# Patient Record
Sex: FEMALE | Race: WHITE | NOT HISPANIC OR LATINO | Employment: FULL TIME | URBAN - METROPOLITAN AREA
[De-identification: names, ages, dates, MRNs, and addresses within clinical notes are randomized per-mention and may not be internally consistent; named-entity substitution may affect disease eponyms.]

---

## 2020-11-02 ENCOUNTER — HISTORICAL (OUTPATIENT)
Dept: ADMINISTRATIVE | Facility: HOSPITAL | Age: 45
End: 2020-11-02

## 2020-11-03 LAB — GRAM STN SPEC: NORMAL

## 2020-11-05 LAB — FINAL CULTURE: NORMAL

## 2020-12-18 ENCOUNTER — HISTORICAL (OUTPATIENT)
Dept: RADIOLOGY | Facility: HOSPITAL | Age: 45
End: 2020-12-18

## 2021-01-11 ENCOUNTER — HISTORICAL (OUTPATIENT)
Dept: ADMINISTRATIVE | Facility: HOSPITAL | Age: 46
End: 2021-01-11

## 2021-01-11 LAB
ABS NEUT (OLG): 2.45 X10(3)/MCL (ref 2.1–9.2)
ALBUMIN SERPL-MCNC: 4.2 GM/DL (ref 3.5–5)
ALBUMIN/GLOB SERPL: 1.3 RATIO (ref 1.1–2)
ALP SERPL-CCNC: 36 UNIT/L (ref 40–150)
ALT SERPL-CCNC: 15 UNIT/L (ref 0–55)
APPEARANCE, UA: CLEAR
AST SERPL-CCNC: 19 UNIT/L (ref 5–34)
BACTERIA #/AREA URNS AUTO: ABNORMAL /HPF
BASOPHILS # BLD AUTO: 0 X10(3)/MCL (ref 0–0.2)
BASOPHILS NFR BLD AUTO: 1 %
BILIRUB SERPL-MCNC: 0.6 MG/DL
BILIRUB UR QL STRIP: NEGATIVE
BILIRUBIN DIRECT+TOT PNL SERPL-MCNC: 0.3 MG/DL (ref 0–0.5)
BILIRUBIN DIRECT+TOT PNL SERPL-MCNC: 0.3 MG/DL (ref 0–0.8)
BUN SERPL-MCNC: 10 MG/DL (ref 7–18.7)
CALCIUM SERPL-MCNC: 8.6 MG/DL (ref 8.4–10.2)
CHLORIDE SERPL-SCNC: 105 MMOL/L (ref 98–107)
CO2 SERPL-SCNC: 26 MMOL/L (ref 22–29)
COLOR UR: ABNORMAL
CREAT SERPL-MCNC: 0.91 MG/DL (ref 0.55–1.02)
EOSINOPHIL # BLD AUTO: 0 X10(3)/MCL (ref 0–0.9)
EOSINOPHIL NFR BLD AUTO: 1 %
ERYTHROCYTE [DISTWIDTH] IN BLOOD BY AUTOMATED COUNT: 12.2 % (ref 11.5–14.5)
GLOBULIN SER-MCNC: 3.3 GM/DL (ref 2.4–3.5)
GLUCOSE (UA): NEGATIVE
GLUCOSE SERPL-MCNC: 77 MG/DL (ref 74–100)
HCT VFR BLD AUTO: 41.6 % (ref 35–46)
HGB BLD-MCNC: 13.8 GM/DL (ref 12–16)
HGB UR QL STRIP: NEGATIVE
HYALINE CASTS #/AREA URNS LPF: ABNORMAL /LPF
IMM GRANULOCYTES # BLD AUTO: 0.01 10*3/UL
IMM GRANULOCYTES NFR BLD AUTO: 0 %
KETONES UR QL STRIP: NEGATIVE
LEUKOCYTE ESTERASE UR QL STRIP: NEGATIVE
LYMPHOCYTES # BLD AUTO: 1.9 X10(3)/MCL (ref 0.6–4.6)
LYMPHOCYTES NFR BLD AUTO: 39 %
MCH RBC QN AUTO: 35.4 PG (ref 26–34)
MCHC RBC AUTO-ENTMCNC: 33.2 GM/DL (ref 31–37)
MCV RBC AUTO: 106.7 FL (ref 80–100)
MONOCYTES # BLD AUTO: 0.5 X10(3)/MCL (ref 0.1–1.3)
MONOCYTES NFR BLD AUTO: 9 %
NEUTROPHILS # BLD AUTO: 2.45 X10(3)/MCL (ref 2.1–9.2)
NEUTROPHILS NFR BLD AUTO: 50 %
NITRITE UR QL STRIP: NEGATIVE
PH UR STRIP: 7 [PH] (ref 4.5–8)
PLATELET # BLD AUTO: 195 X10(3)/MCL (ref 130–400)
PMV BLD AUTO: 10.4 FL (ref 7.4–10.4)
POTASSIUM SERPL-SCNC: 3.8 MMOL/L (ref 3.5–5.1)
PROT SERPL-MCNC: 7.5 GM/DL (ref 6.4–8.3)
PROT UR QL STRIP: NEGATIVE
RBC # BLD AUTO: 3.9 X10(6)/MCL (ref 4–5.2)
RBC #/AREA URNS AUTO: ABNORMAL /HPF
SODIUM SERPL-SCNC: 139 MMOL/L (ref 136–145)
SP GR UR STRIP: 1 (ref 1–1.03)
SQUAMOUS #/AREA URNS LPF: ABNORMAL /LPF
T4 FREE SERPL-MCNC: 1.05 NG/DL (ref 0.7–1.48)
TSH SERPL-ACNC: 1.59 UIU/ML (ref 0.35–4.94)
UROBILINOGEN UR STRIP-ACNC: NORMAL
WBC # SPEC AUTO: 4.9 X10(3)/MCL (ref 4.5–11)
WBC #/AREA URNS AUTO: ABNORMAL /HPF

## 2021-01-13 LAB — FINAL CULTURE: NO GROWTH

## 2021-02-05 ENCOUNTER — HISTORICAL (OUTPATIENT)
Dept: ADMINISTRATIVE | Facility: HOSPITAL | Age: 46
End: 2021-02-05

## 2021-02-05 LAB
CHOLEST SERPL-MCNC: 134 MG/DL
CHOLEST/HDLC SERPL: 2 {RATIO} (ref 0–5)
HDLC SERPL-MCNC: 54 MG/DL (ref 35–60)
LDLC SERPL CALC-MCNC: 67 MG/DL (ref 50–140)
TRIGL SERPL-MCNC: 63 MG/DL (ref 37–140)
VLDLC SERPL CALC-MCNC: 13 MG/DL

## 2021-02-23 ENCOUNTER — HISTORICAL (OUTPATIENT)
Dept: ADMINISTRATIVE | Facility: HOSPITAL | Age: 46
End: 2021-02-23

## 2021-02-23 LAB — SARS-COV-2 RNA RESP QL NAA+PROBE: NEGATIVE

## 2021-02-25 LAB — FINAL CULTURE: NORMAL

## 2021-04-08 LAB — SARS-COV-2 RNA RESP QL NAA+PROBE: NEGATIVE

## 2021-08-02 ENCOUNTER — HISTORICAL (OUTPATIENT)
Dept: ADMINISTRATIVE | Facility: HOSPITAL | Age: 46
End: 2021-08-02

## 2021-08-02 LAB — SARS-COV-2 RNA RESP QL NAA+PROBE: POSITIVE

## 2021-10-17 ENCOUNTER — HISTORICAL (OUTPATIENT)
Dept: LAB | Facility: HOSPITAL | Age: 46
End: 2021-10-17

## 2021-11-03 LAB
HUMAN PAPILLOMAVIRUS (HPV): NORMAL
PAP RECOMMENDATION EXT: NORMAL
PAP SMEAR: NORMAL

## 2022-01-03 ENCOUNTER — HISTORICAL (OUTPATIENT)
Dept: ADMINISTRATIVE | Facility: HOSPITAL | Age: 47
End: 2022-01-03

## 2022-01-03 LAB
ABS NEUT (OLG): 3.67 X10(3)/MCL (ref 2.1–9.2)
ALBUMIN SERPL-MCNC: 4 GM/DL (ref 3.5–5)
ALBUMIN/GLOB SERPL: 1.2 RATIO (ref 1.1–2)
ALP SERPL-CCNC: 41 UNIT/L (ref 40–150)
ALT SERPL-CCNC: 14 UNIT/L (ref 0–55)
APPEARANCE, UA: CLEAR
AST SERPL-CCNC: 18 UNIT/L (ref 5–34)
BACTERIA SPEC CULT: ABNORMAL
BASOPHILS # BLD AUTO: 0 X10(3)/MCL (ref 0–0.2)
BASOPHILS NFR BLD AUTO: 0 %
BILIRUB SERPL-MCNC: 0.4 MG/DL
BILIRUB UR QL STRIP: NEGATIVE
BILIRUBIN DIRECT+TOT PNL SERPL-MCNC: 0.2 MG/DL (ref 0–0.5)
BILIRUBIN DIRECT+TOT PNL SERPL-MCNC: 0.2 MG/DL (ref 0–0.8)
BUN SERPL-MCNC: 15.1 MG/DL (ref 7–18.7)
CALCIUM SERPL-MCNC: 9.3 MG/DL (ref 8.7–10.5)
CHLORIDE SERPL-SCNC: 106 MMOL/L (ref 98–107)
CO2 SERPL-SCNC: 26 MMOL/L (ref 22–29)
COLOR UR: COLORLESS
CREAT SERPL-MCNC: 0.78 MG/DL (ref 0.55–1.02)
DEPRECATED CALCIDIOL+CALCIFEROL SERPL-MC: 36.6 NG/ML (ref 30–80)
EOSINOPHIL # BLD AUTO: 0 X10(3)/MCL (ref 0–0.9)
EOSINOPHIL NFR BLD AUTO: 0 %
ERYTHROCYTE [DISTWIDTH] IN BLOOD BY AUTOMATED COUNT: 12.6 % (ref 11.5–14.5)
EST. AVERAGE GLUCOSE BLD GHB EST-MCNC: 91.1 MG/DL
GLOBULIN SER-MCNC: 3.4 GM/DL (ref 2.4–3.5)
GLUCOSE (UA): NORMAL /UL
GLUCOSE SERPL-MCNC: 80 MG/DL (ref 74–100)
HBA1C MFR BLD: 4.8 %
HCT VFR BLD AUTO: 42.1 % (ref 35–46)
HGB BLD-MCNC: 14.1 GM/DL (ref 12–16)
HGB UR QL STRIP: NEGATIVE /HPF
HYALINE CASTS #/AREA URNS LPF: ABNORMAL /LPF
IMM GRANULOCYTES # BLD AUTO: 0.02 10*3/UL
IMM GRANULOCYTES NFR BLD AUTO: 0 %
KETONES UR QL STRIP: NEGATIVE /UL
LEUKOCYTE ESTERASE UR QL STRIP: NEGATIVE
LYMPHOCYTES # BLD AUTO: 1.9 X10(3)/MCL (ref 0.6–4.6)
LYMPHOCYTES NFR BLD AUTO: 31 %
MCH RBC QN AUTO: 35.6 PG (ref 26–34)
MCHC RBC AUTO-ENTMCNC: 33.5 GM/DL (ref 31–37)
MCV RBC AUTO: 106.3 FL (ref 80–100)
MONOCYTES # BLD AUTO: 0.4 X10(3)/MCL (ref 0.1–1.3)
MONOCYTES NFR BLD AUTO: 6 %
MUCOUS THREADS URNS QL MICRO: ABNORMAL /LPF
NEUTROPHILS # BLD AUTO: 3.67 X10(3)/MCL (ref 2.1–9.2)
NEUTROPHILS NFR BLD AUTO: 61 %
NITRITE UR QL STRIP: NEGATIVE
NRBC BLD AUTO-RTO: 0 % (ref 0–0.2)
PH UR STRIP: 5 /UL (ref 4.5–8)
PLATELET # BLD AUTO: 199 X10(3)/MCL (ref 130–400)
PMV BLD AUTO: 10.2 FL (ref 7.4–10.4)
POTASSIUM SERPL-SCNC: 4.7 MMOL/L (ref 3.5–5.1)
PROT SERPL-MCNC: 7.4 GM/DL (ref 6.4–8.3)
PROT UR QL STRIP: NEGATIVE /UL
RBC # BLD AUTO: 3.96 X10(6)/MCL (ref 4–5.2)
RBC #/AREA URNS HPF: ABNORMAL /HPF
SODIUM SERPL-SCNC: 139 MMOL/L (ref 136–145)
SP GR UR STRIP: 1.01 (ref 1–1.03)
SQUAMOUS EPITHELIAL, UA: ABNORMAL /LPF
T4 FREE SERPL-MCNC: 0.94 NG/DL (ref 0.7–1.48)
TSH SERPL-ACNC: 0.56 UIU/ML (ref 0.35–4.94)
UROBILINOGEN UR STRIP-ACNC: NORMAL /HPF
VIT B12 SERPL-MCNC: 529 PG/ML (ref 213–816)
WBC # SPEC AUTO: 6 X10(3)/MCL (ref 4.5–11)
WBC #/AREA URNS HPF: ABNORMAL /HPF

## 2022-01-20 ENCOUNTER — HISTORICAL (OUTPATIENT)
Dept: ADMINISTRATIVE | Facility: HOSPITAL | Age: 47
End: 2022-01-20

## 2022-01-20 LAB
FLUAV AG UPPER RESP QL IA.RAPID: NEGATIVE
FLUBV AG UPPER RESP QL IA.RAPID: NEGATIVE
SARS-COV-2 RNA RESP QL NAA+PROBE: NEGATIVE

## 2022-04-05 ENCOUNTER — HISTORICAL (OUTPATIENT)
Dept: RADIOLOGY | Facility: HOSPITAL | Age: 47
End: 2022-04-05

## 2022-04-05 ENCOUNTER — HISTORICAL (OUTPATIENT)
Dept: ADMINISTRATIVE | Facility: HOSPITAL | Age: 47
End: 2022-04-05

## 2022-04-10 ENCOUNTER — HISTORICAL (OUTPATIENT)
Dept: ADMINISTRATIVE | Facility: HOSPITAL | Age: 47
End: 2022-04-10
Payer: COMMERCIAL

## 2022-04-24 VITALS
SYSTOLIC BLOOD PRESSURE: 124 MMHG | HEIGHT: 65 IN | OXYGEN SATURATION: 97 % | BODY MASS INDEX: 21.6 KG/M2 | DIASTOLIC BLOOD PRESSURE: 88 MMHG | WEIGHT: 129.63 LBS

## 2022-05-03 NOTE — HISTORICAL [facility] CERNER
This is a historical note converted from [place]. Formatting and pictures may have been removed.  Please reference [place] for original formatting and attached multimedia. Chief Complaint  Abrasion, corneal  History of Present Illness  Ms. Beltran is a 45 year old female with no significant PMH who presents as a referral from ED for corneal ulcer. The patient reports 3-4 days ago her left eye became irritated and very red. She stopped wearing her contact lenses at that time and noted the redness to improve slightly, however, this morning she put her contacts back in to drive to the ED. She denies previous history of ulcers. She has worn contacts for 3 years. She uses monthly contacts but uses them for two months. She never sleeps in her contacts and uses [employer] or [employer] brand contact solution. Her most recent contacts are approx 2 weeks old.  ?  POH: +CTL wear, no trauma, no surgeries  Fam Hx: no history of glaucoma or macular degeneration  PMHx: None  ?  Review of Systems  12 point ROS negative except as noted in HPI  Physical Exam  Vitals & Measurements  HT:?162.00?cm? WT:?62.000?kg? BMI:?23.62?  OD cc : ??20/25?  OS sc : ??20/200?  OS Pinhole : ??20/30?  Visual Acuity Comments : ??OD 20/25 WITH CONTACT IN PLACE, PH NI?  ?Tonometry Method : ??Electronic indentation?  Tonometry Time of Day : ??15:13 CST?  OD Intraocular Pressure : ??10 mmHg?  OS Intraocular Pressure : ??6 mmHg?  ?  Pupils: 4-->2, no APD, OU  EOM: Full, ortho, OU  CVF:Full OU  ?   Exam:  External: Normal OU  ?   SLE:  L/L: Normal OU  C/S: white and quiet OD // tr injection  K: scattered PEE OD // paracentral <1mm stromal infiltrate with overlying negative staining at 8 oclock  AC: Deep and quiet OD // 1 cell per HPF  Iris: FRR, no NVI OU  Lens: Clear OU  Vit: Clear OU  ?   Dilated with P&T at 330  ?   DFE:  N: pink, sharp, no NVD OU  C/D:0.2  Macula: Flat, good FLR OU, faint 1/4DD choroidal nevus inferior to disc along arcade OD  Vessels: Normal  OU  Periphery: Flat, no h/t/b 360 OU  ?   B scan OD: flat, no masses  Fundus photos today  ?   Assessment/Plan:  1. Corneal Ulcer, left eye  - +CTL  - Contacts sent for culture today  - Small, paracentral, no epi defect  - Start vigamox QID OS  ?   2. Choroidal nevus, OD  - Fundus photos today  - Within 2 DD of optic nerve  - Bscan flat  - Monitor  ?  Seen and dw Dr. Cormier  ?  RTC 1 day for K check  Referrals  Clinic Follow up, *Est. 11/03/20 3:00:00 CST, Order for future visit, Corneal ulcer of left eye  Choroidal nevus of right eye, [facility]   Problem List/Past Medical History  Ongoing  No qualifying data  Historical  Pregnant  Pregnant  Pregnant  Pregnant  Procedure/Surgical History  Dilation and curettage (1995)  Bilateral tubal ligation  Ovarian cystectomy  Uterine operation   Medications  Ambien, Oral, Once a day (at bedtime),? ?Not taking  Allergies  No Known Medication Allergies  Social History  Abuse/Neglect  No, 11/02/2020  No, No, Yes, 11/02/2020  Alcohol  Current, 1-2 times per month, 10/01/2020  Employment/School  Employed, 10/01/2020  Exercise  Exercise type: Weight lifting, cardio., 10/01/2020  Home/Environment  Lives with roommate., 10/01/2020  Nutrition/Health  Regular, 10/01/2020  Sexual  Sexually active: No. Gender Identity Identifies as female., 10/01/2020  Substance Use  Never, 10/01/2020  Tobacco  Never (less than 100 in lifetime), N/A, 11/02/2020  Never (less than 100 in lifetime), No, 11/02/2020  Family History  Primary malignant neoplasm of breast: Aunt.  Primary malignant neoplasm of ovary: Mother.  Health Maintenance  Health Maintenance  ???Pending?(in the next year)  ??? ??Due?  ??? ? ? ?Influenza Vaccine due??10/01/20??and every 1??day(s)  ??? ? ? ?Diabetes Screening due??11/02/20??Unknown Frequency  ??? ? ? ?Lipid Screening due??11/02/20??Unknown Frequency  ??? ? ? ?Tetanus Vaccine due??11/02/20??and every 10??year(s)  ??? ??Due In Future?  ??? ? ? ?Obesity Screening not due  until??01/01/21??and every 1??year(s)  ??? ? ? ?Alcohol Misuse Screening not due until??01/02/21??and every 1??year(s)  ??? ? ? ?Depression Screening not due until??10/01/21??and every 1??year(s)  ??? ? ? ?ADL Screening not due until??10/01/21??and every 1??year(s)  ???Satisfied?(in the past 1 year)  ??? ??Satisfied?  ??? ? ? ?ADL Screening on??10/01/20.??Satisfied by Tiffanie Guillaume  ??? ? ? ?Alcohol Misuse Screening on??10/01/20.??Satisfied by Tiffanie Guillaume  ??? ? ? ?Blood Pressure Screening on??11/02/20.??Satisfied by Janette Rice  ??? ? ? ?Body Mass Index Check on??11/02/20.??Satisfied by CHERRY Anaya Daria  ??? ? ? ?Cervical Cancer Screening on??10/01/20.??Satisfied by Alaina Deleon  ??? ? ? ?Depression Screening on??10/01/20.??Satisfied by Tiffanie Guillaume  ??? ? ? ?Obesity Screening on??11/02/20.??Satisfied by CHERRY Anaya Daria  ?

## 2022-05-03 NOTE — HISTORICAL [facility] CERNER
This is a historical note converted from [place]. Formatting and pictures may have been removed.  Please reference K[place]on for original formatting and attached multimedia. Chief Complaint  EST pcp, Anxiety  History of Present Illness  45 Years?White?Female?presents to [facility] to establish care?with PCP. ?PMH (per review of?available EMR?and confirmed with patient).  ?   Previous PCP:? denies  PmHx:? Insomnia, Anxiety  SHx: Tubal Ligation (),  Uterus surgery for tilting; Cyst removed from ovary  FHx:?? Mother:?ovarian cancer, still living-hysterectomy  ??????????Father: CAD,  at 56 from [place]  ????????? Brother: HTN, CAD  ?   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 coke every couple of days  Alcohol intake???on weekends, wine?  Tobacco Intake?? no smoking  Illicit drug use? no drugs  Sexual history:? denies; prefers males  Lives with??? alone  Daily Exercise??? 3-4 days a week; lifts weights, cardio  ?   Complaints today:? c/o mind racing and tremors, trouble sleeping.? This has been occurring for a couple of weeks.? States she had a situation recently that she thinks is contributing. She feels as though her mind is racing and she is unable to stop it.? She also has mild tremor.? Has had some mild palpitations as well but denies CP, SOB, n/v/d.  ?   Cancer Screening:  PAP: Sees GYN? here; UTD   MM  ?   Vaccines:  Tetanus/Tdap:? due  Flu: refused  Review of Systems  General: negative except as stated in hpi  Skin: negative except as stated in hpi  HEENT: negative except as stated in hpi  Respiratory: negative except as stated in hpi  CV: negative except as stated in hpi  GI: negative except as stated in hpi  : negative except as stated in hpi  MS: negative except as stated in hpi  Neuro: negative except as stated in hpi  Hematologic: negative except as stated in hpi  Endocrine: negative except as stated in  hpi  Psych: negative except as stated in hpi  Physical Exam  Vitals & Measurements  T:?36.6? ?C (Oral)? HR:?70(Peripheral)? RR:?20? BP:?136/88? SpO2:?99%?  HT:?162.00?cm? WT:?66.900?kg? BMI:?25.49? LMP:?01/10/2020 00:00 CST?  General: Alert and oriented, No acute distress.  Head: Normocephalic, Atraumatic.  Eye: Pupils are equal, round and reactive to light, Extraocular movements intact, Sclera non-icteric.  Ears/Nose/Throat:?Normal, Mucosa moist, Clear. Active PND.  Neck/Thyroid: Supple, Non-tender, No carotid bruit, No lymphadenopathy, Full range of motion.  Respiratory: Clear to auscultation bilaterally, Respirations non-labored, Breath sounds equal, Symmetrical chest wall expansion, No wheezes, rales or rhonchi.  Cardiovascular: Regular rate and rhythm, No murmurs, rubs or gallops.  Gastrointestinal: Soft, Non-tender, Non-distended, Normal bowel sounds.  Musculoskeletal: Normal range of motion.  Integumentary: Warm, Dry, Intact, No suspicious lesions or rashes.  Extremities: No clubbing, cyanosis or edema.  Neurologic: No focal deficits, Cranial Nerves II-XII are grossly intact, Motor strength normal upper and lower extremities, Sensory exam intact.  Psychiatric: Normal interaction, Coherent speech, Appropriate affect.  Assessment/Plan  1.?Anxiety?F41.9  Lexapro 5 mg po daily  Refer to Psych NP  Take meds as prescribed.  Practice deep breathing or abdominal breathing exercises when anxiety occurs.  Exercise daily. Get sunlight daily.  Avoid caffeine, alcohol?and stimulants.  Do not use illicit drugs.  Practice positive phrases and repeat throughout the day, yoga,?lavender scents or Chamomile tea will help anxiety.  Set healthy boundaries, avoid people and conversations that increase stress.  Reports any symptoms of suicidal or homicidal ideations immediately, go to nearest emergency room.  Ordered:  escitalopram, 5 mg = 1 tab(s), Oral, Daily, # 30 tab(s), 3 Refill(s), Pharmacy: [id number], 162, cm,  Height/Length Dosing, 01/11/21 8:00:00 CST, 66.9, kg, Weight Dosing, 01/11/21 8:00:00 CST  ondansetron, 8 mg = 1 tab(s), Oral, TID, # 15 tab(s), 1 Refill(s), Pharmacy: [place] [facility] [id number] [id number], 162, cm, Height/Length Dosing, 01/11/21 8:00:00 CST, 66.9, kg, Weight Dosing, 01/11/21 8:00:00 CST  1160F- Medication reconciliation completed during visit, Anxiety, [facility], 01/11/21 8:42:00 CST  CBC w/ Auto Diff, Routine collect, 01/11/21 8:42:00 CST, Blood, Order for future visit, Stop date 01/11/21 8:42:00 CST, Lab Collect, Anxiety, 01/11/21 8:42:00 CST  Clinic Follow up, *Est. 02/11/21 3:00:00 CST, Order for future visit, Anxiety  Immunization due, [facility]  Comprehensive Metabolic Panel, Routine collect, 01/11/21 8:42:00 CST, Blood, Order for future visit, Stop date 01/11/21 8:42:00 CST, Lab Collect, Anxiety, 01/11/21 8:42:00 CST  Free T4, Routine collect, 01/11/21 8:42:00 CST, Blood, Order for future visit, Stop date 01/11/21 8:42:00 CST, Lab Collect, Anxiety, 01/11/21 8:42:00 CST  Office/Outpatient Visit Level 4 Established [id number] PC, Anxiety  Immunization due, [facility], 01/11/21 8:57:00 CST  Thyroid Stimulating Hormone, Routine collect, 01/11/21 8:42:00 CST, Blood, Order for future visit, Stop date 01/11/21 8:42:00 CST, Lab Collect, Anxiety, 01/11/21 8:42:00 CST  Urinalysis with Microscopic if Indicated, Routine collect, Urine, Order for future visit, 01/11/21 8:43:00 CST, Stop date 01/11/21 8:43:00 CST, Nurse collect, Anxiety  Urine Culture [id number], Routine collect, 01/11/21 8:43:00 CST, Order for future visit, Urine, Nurse collect, Stop date 01/11/21 8:43:00 CST, Anxiety  Vaccines initial, 01/11/21 9:02:00 CST, [facility], Routine, 01/11/21 9:02:00 CST, Anxiety  Immunization due  ?  2.?Immunization due?Z23,?Immunization due?Z23  TDAP?today  Ordered:  Clinic Follow up, *Est. 02/11/21 3:00:00 CST, Order for future visit, Anxiety  Immunization due, [facility]  Office/Outpatient Visit Level 4 Established [address] PC, Anxiety  Immunization due, [facility], 01/11/21 8:57:00 CST  Vaccines initial, 01/11/21 9:02:00 CST, [facility], Routine, 01/11/21 9:02:00 CST, Anxiety  Immunization due  ?  3.?Encounter for wellness examination?Z00.00  Labs today: CBC, CMP, TSH, Free T4, UA. Urine culture  FLP in 1 month  Ordered:  Lipid Panel, Routine collect, *Est. 02/11/21 3:00:00 CST, Blood, Order for future visit, *Est. Stop date 02/11/21 3:00:00 CST, Lab Collect, Encounter for wellness examination, 01/12/21 5:00:00 CST  ?  Referrals  [facility] Referral to IM Psych Nurse, Specialty: Mental Health, Outpatient, Reason: Treat and Evaluate Anxiety believed to be situational, Refer To: Outpatient, Mental Health, [facility], [address] [place] [address]., Start: 01/11/21 9:11:00 CST, Exclusions: No  Clinic Follow up, *Est. 02/11/21 3:00:00 CST, Order for future visit, Anxiety  Immunization due, [facility]   Problem List/Past Medical History  Ongoing  No chronic problems  Historical  Pregnant  Pregnant  Pregnant  Pregnant  Procedure/Surgical History  Dilation and curettage (1995)  Bilateral tubal ligation  Ovarian cystectomy  Uterine operation   Medications  Ambien, Oral, Once a day (at bedtime)  Lexapro 5 mg oral tablet, 5 mg= 1 tab(s), Oral, Daily, 3 refills  naproxen 500 mg oral delayed release tablet, See Instructions, 6 refills  Zofran ODT 8 mg oral tablet, disintegrating, 8 mg= 1 tab(s), Oral, TID, 1 refills  Allergies  No Known Medication Allergies  Social History  Abuse/Neglect  No, No, Yes, 01/11/2021  Alcohol  Current, 1-2 times per month, 10/01/2020  Employment/School  Employed, 10/01/2020  Exercise  Exercise type: Weight lifting, cardio., 10/01/2020  Financial/Legal Situation  None, 01/11/2021  Home/Environment  Lives with roommate., 10/01/2020    Never in , 01/11/2021  Nutrition/Health  Regular,  10/01/2020  Sexual  Sexually active: No. Gender Identity Identifies as female., 11/03/2020  Spiritual/Cultural  Non denomination, 01/11/2021  Substance Use  Never, 10/01/2020  Tobacco  Never (less than 100 in lifetime), N/A, 01/11/2021  Family History  Primary malignant neoplasm of breast: Aunt.  Primary malignant neoplasm of ovary: Mother.  Immunizations  Vaccine Date Status   tetanus/diphtheria/pertussis, acel(Tdap) 01/11/2021 Given   tetanus/diphtheria/pertussis, acel(Tdap) 12/28/2009 Recorded   measles/mumps/rubella virus vaccine 08/20/1991 Recorded   poliovirus vaccine, live, trivalent 01/27/1977 Recorded   poliovirus vaccine, live, trivalent 08/14/1975 Recorded   poliovirus vaccine, live, trivalent 08/22/1974 Recorded   poliovirus vaccine, live, trivalent 05/22/1974 Recorded   poliovirus vaccine, live, trivalent 03/13/1974 Recorded   Health Maintenance  Health Maintenance  ???Pending?(in the next year)  ??? ??Due?  ??? ? ? ?Lipid Screening due??01/11/21??Unknown Frequency  ??? ??Due In Future?  ??? ? ? ?ADL Screening not due until??10/01/21??and every 1??year(s)  ??? ? ? ?Obesity Screening not due until??01/01/22??and every 1??year(s)  ??? ? ? ?Alcohol Misuse Screening not due until??01/02/22??and every 1??year(s)  ???Satisfied?(in the past 1 year)  ??? ??Satisfied?  ??? ? ? ?ADL Screening on??10/01/20.??Satisfied by Tiffanie Guillaume  ??? ? ? ?Alcohol Misuse Screening on??01/11/21.??Satisfied by Gale Benavidez LPN  ??? ? ? ?Blood Pressure Screening on??01/11/21.??Satisfied by Gale Benavidez LPN  ??? ? ? ?Body Mass Index Check on??01/11/21.??Satisfied by Gale Benavidez LPN  ??? ? ? ?Breast Cancer Screening on??12/18/20.??Satisfied by Munira Damon  ??? ? ? ?Cervical Cancer Screening on??10/01/20.??Satisfied by Alaina Deleon  ??? ? ? ?Depression Screening on??01/11/21.??Satisfied by Gale Benavidez LPN  ??? ? ? ?Influenza Vaccine on??01/11/21.??Satisfied by Gale Benavidez LPN  ??? ? ?  ?Obesity Screening on??01/11/21.??Satisfied by Gale Benavidez LPN  ??? ? ? ?Tetanus Vaccine on??01/11/21.??Satisfied by Gale Benavidez LPN  ??? ??Refused?  ??? ? ? ?Influenza Vaccine on??01/11/21.??Recorded by Gale Benavidez LPN??Reason: Patient Refuses  ?

## 2022-05-06 ENCOUNTER — CLINICAL SUPPORT (OUTPATIENT)
Dept: FAMILY MEDICINE | Facility: CLINIC | Age: 47
End: 2022-05-06
Payer: COMMERCIAL

## 2022-05-06 ENCOUNTER — TELEPHONE (OUTPATIENT)
Dept: FAMILY MEDICINE | Facility: CLINIC | Age: 47
End: 2022-05-06

## 2022-05-06 DIAGNOSIS — Z00.00 ENCOUNTER FOR WELLNESS EXAMINATION: Primary | ICD-10-CM

## 2022-05-06 DIAGNOSIS — Z00.00 ENCOUNTER FOR WELLNESS EXAMINATION: ICD-10-CM

## 2022-05-06 LAB
APPEARANCE UR: CLEAR
BACTERIA #/AREA URNS AUTO: ABNORMAL /HPF
BASOPHILS # BLD AUTO: 0.05 X10(3)/MCL (ref 0–0.2)
BASOPHILS NFR BLD AUTO: 0.9 %
BILIRUB UR QL STRIP.AUTO: NEGATIVE MG/DL
COLOR UR AUTO: ABNORMAL
EOSINOPHIL # BLD AUTO: 0.07 X10(3)/MCL (ref 0–0.9)
EOSINOPHIL NFR BLD AUTO: 1.3 %
ERYTHROCYTE [DISTWIDTH] IN BLOOD BY AUTOMATED COUNT: 12.4 % (ref 11.5–17)
GLUCOSE UR QL STRIP.AUTO: NORMAL MG/DL
HCT VFR BLD AUTO: 40.9 % (ref 37–47)
HGB BLD-MCNC: 13.4 GM/DL (ref 12–16)
HYALINE CASTS #/AREA URNS LPF: ABNORMAL /LPF
IMM GRANULOCYTES # BLD AUTO: 0.01 X10(3)/MCL (ref 0–0.02)
IMM GRANULOCYTES NFR BLD AUTO: 0.2 % (ref 0–0.43)
KETONES UR QL STRIP.AUTO: NEGATIVE MG/DL
LEUKOCYTE ESTERASE UR QL STRIP.AUTO: NEGATIVE UNIT/L
LYMPHOCYTES # BLD AUTO: 2.01 X10(3)/MCL (ref 0.6–4.6)
LYMPHOCYTES NFR BLD AUTO: 37.2 %
MCH RBC QN AUTO: 34.8 PG (ref 27–31)
MCHC RBC AUTO-ENTMCNC: 32.8 MG/DL (ref 33–36)
MCV RBC AUTO: 106.2 FL (ref 80–94)
MONOCYTES # BLD AUTO: 0.44 X10(3)/MCL (ref 0.1–1.3)
MONOCYTES NFR BLD AUTO: 8.1 %
MUCOUS THREADS URNS QL MICRO: ABNORMAL /LPF
NEUTROPHILS # BLD AUTO: 2.8 X10(3)/MCL (ref 2.1–9.2)
NEUTROPHILS NFR BLD AUTO: 52.3 %
NITRITE UR QL STRIP.AUTO: NEGATIVE
NRBC BLD AUTO-RTO: 0 %
PH UR STRIP.AUTO: 5 [PH]
PLATELET # BLD AUTO: 190 X10(3)/MCL (ref 130–400)
PMV BLD AUTO: 10.2 FL (ref 9.4–12.4)
PROT UR QL STRIP.AUTO: NEGATIVE MG/DL
RBC # BLD AUTO: 3.85 X10(6)/MCL (ref 4.2–5.4)
RBC UR QL AUTO: NEGATIVE UNIT/L
SP GR UR STRIP.AUTO: 1.01
UROBILINOGEN UR STRIP-ACNC: 0.2 MG/DL
WBC # SPEC AUTO: 5.4 X10(3)/MCL (ref 4.5–11.5)
WBC #/AREA URNS AUTO: ABNORMAL /HPF

## 2022-05-06 PROCEDURE — 36415 COLL VENOUS BLD VENIPUNCTURE: CPT

## 2022-05-06 PROCEDURE — 85025 COMPLETE CBC W/AUTO DIFF WBC: CPT

## 2022-05-06 PROCEDURE — 81001 URINALYSIS AUTO W/SCOPE: CPT

## 2022-05-06 NOTE — TELEPHONE ENCOUNTER
Labs reordered    ----- Message from Gale Benavidez LPN sent at 5/5/2022  3:03 PM CDT -----  Regarding: FW: LABS    ----- Message -----  From: Massiel Manuel  Sent: 5/5/2022   8:09 AM CDT  To: Gale Benavidez LPN  Subject: LABS                                             PT CALLED SHE ASKED FOR ANOTHER ORDER FOR HER TO TAKE HER LABS SHE HAS PROMISED TO GO DO IT TOMORROW

## 2022-05-09 DIAGNOSIS — R71.8 ELEVATED MCV: Primary | ICD-10-CM

## 2022-05-19 DIAGNOSIS — R71.8 ELEVATED MCV: ICD-10-CM

## 2022-05-19 LAB
ALBUMIN SERPL-MCNC: 3.7 GM/DL (ref 3.5–5)
ALBUMIN/GLOB SERPL: 1.2 RATIO (ref 1.1–2)
ALP SERPL-CCNC: 32 UNIT/L (ref 40–150)
ALT SERPL-CCNC: 16 UNIT/L (ref 0–55)
AST SERPL-CCNC: 20 UNIT/L (ref 5–34)
BILIRUBIN DIRECT+TOT PNL SERPL-MCNC: 0.2 MG/DL (ref 0–0.5)
BILIRUBIN DIRECT+TOT PNL SERPL-MCNC: 0.3 MG/DL (ref 0–0.8)
BILIRUBIN DIRECT+TOT PNL SERPL-MCNC: 0.5 MG/DL
BUN SERPL-MCNC: 13.4 MG/DL (ref 7–18.7)
CALCIUM SERPL-MCNC: 9 MG/DL (ref 8.4–10.2)
CHLORIDE SERPL-SCNC: 107 MMOL/L (ref 98–107)
CHOLEST SERPL-MCNC: 136 MG/DL
CHOLEST/HDLC SERPL: 2 {RATIO} (ref 0–5)
CO2 SERPL-SCNC: 24 MMOL/L (ref 22–29)
CREAT SERPL-MCNC: 0.79 MG/DL (ref 0.55–1.02)
FOLATE SERPL-MCNC: 15.4 NG/ML (ref 7–31.4)
GLOBULIN SER-MCNC: 3.1 GM/DL (ref 2.4–3.5)
GLUCOSE SERPL-MCNC: 76 MG/DL (ref 74–100)
HDLC SERPL-MCNC: 58 MG/DL (ref 35–60)
LDLC SERPL CALC-MCNC: 70 MG/DL (ref 50–140)
POTASSIUM SERPL-SCNC: 4.6 MMOL/L (ref 3.5–5.1)
PROT SERPL-MCNC: 6.8 GM/DL (ref 6.4–8.3)
SODIUM SERPL-SCNC: 137 MMOL/L (ref 136–145)
T4 FREE SERPL-MCNC: 0.75 NG/DL (ref 0.7–1.48)
TRIGL SERPL-MCNC: 39 MG/DL (ref 37–140)
TSH SERPL-ACNC: 1.02 UIU/ML (ref 0.35–4.94)
VLDLC SERPL CALC-MCNC: 8 MG/DL

## 2022-05-19 PROCEDURE — 82746 ASSAY OF FOLIC ACID SERUM: CPT | Performed by: NURSE PRACTITIONER

## 2022-05-19 PROCEDURE — 36415 COLL VENOUS BLD VENIPUNCTURE: CPT

## 2022-05-19 PROCEDURE — 84439 ASSAY OF FREE THYROXINE: CPT

## 2022-05-19 PROCEDURE — 80053 COMPREHEN METABOLIC PANEL: CPT

## 2022-05-19 PROCEDURE — 80061 LIPID PANEL: CPT

## 2022-05-19 PROCEDURE — 84443 ASSAY THYROID STIM HORMONE: CPT

## 2022-05-20 ENCOUNTER — TELEPHONE (OUTPATIENT)
Dept: FAMILY MEDICINE | Facility: CLINIC | Age: 47
End: 2022-05-20
Payer: COMMERCIAL

## 2022-05-20 NOTE — TELEPHONE ENCOUNTER
----- Message from RUSSELL Lindsay sent at 5/20/2022  7:26 AM CDT -----  Folate level was normal.  We will continue to monitor labs moving forward.

## 2022-05-23 ENCOUNTER — OFFICE VISIT (OUTPATIENT)
Dept: FAMILY MEDICINE | Facility: CLINIC | Age: 47
End: 2022-05-23
Payer: COMMERCIAL

## 2022-05-23 VITALS
RESPIRATION RATE: 20 BRPM | WEIGHT: 130.38 LBS | BODY MASS INDEX: 22.26 KG/M2 | HEIGHT: 64 IN | HEART RATE: 61 BPM | DIASTOLIC BLOOD PRESSURE: 84 MMHG | OXYGEN SATURATION: 99 % | TEMPERATURE: 98 F | SYSTOLIC BLOOD PRESSURE: 140 MMHG

## 2022-05-23 DIAGNOSIS — F41.9 ANXIETY: ICD-10-CM

## 2022-05-23 DIAGNOSIS — G47.00 INSOMNIA, UNSPECIFIED TYPE: Primary | ICD-10-CM

## 2022-05-23 PROCEDURE — 3008F BODY MASS INDEX DOCD: CPT | Mod: CPTII,,, | Performed by: NURSE PRACTITIONER

## 2022-05-23 PROCEDURE — 3077F SYST BP >= 140 MM HG: CPT | Mod: CPTII,,, | Performed by: NURSE PRACTITIONER

## 2022-05-23 PROCEDURE — 1159F MED LIST DOCD IN RCRD: CPT | Mod: CPTII,,, | Performed by: NURSE PRACTITIONER

## 2022-05-23 PROCEDURE — 3079F PR MOST RECENT DIASTOLIC BLOOD PRESSURE 80-89 MM HG: ICD-10-PCS | Mod: CPTII,,, | Performed by: NURSE PRACTITIONER

## 2022-05-23 PROCEDURE — 3077F PR MOST RECENT SYSTOLIC BLOOD PRESSURE >= 140 MM HG: ICD-10-PCS | Mod: CPTII,,, | Performed by: NURSE PRACTITIONER

## 2022-05-23 PROCEDURE — 99214 OFFICE O/P EST MOD 30 MIN: CPT | Mod: S$PBB,,, | Performed by: NURSE PRACTITIONER

## 2022-05-23 PROCEDURE — 1160F PR REVIEW ALL MEDS BY PRESCRIBER/CLIN PHARMACIST DOCUMENTED: ICD-10-PCS | Mod: CPTII,,, | Performed by: NURSE PRACTITIONER

## 2022-05-23 PROCEDURE — 1160F RVW MEDS BY RX/DR IN RCRD: CPT | Mod: CPTII,,, | Performed by: NURSE PRACTITIONER

## 2022-05-23 PROCEDURE — 3079F DIAST BP 80-89 MM HG: CPT | Mod: CPTII,,, | Performed by: NURSE PRACTITIONER

## 2022-05-23 PROCEDURE — 1159F PR MEDICATION LIST DOCUMENTED IN MEDICAL RECORD: ICD-10-PCS | Mod: CPTII,,, | Performed by: NURSE PRACTITIONER

## 2022-05-23 PROCEDURE — 99214 OFFICE O/P EST MOD 30 MIN: CPT | Mod: PBBFAC,PN | Performed by: NURSE PRACTITIONER

## 2022-05-23 PROCEDURE — 99214 PR OFFICE/OUTPT VISIT, EST, LEVL IV, 30-39 MIN: ICD-10-PCS | Mod: S$PBB,,, | Performed by: NURSE PRACTITIONER

## 2022-05-23 PROCEDURE — 3008F PR BODY MASS INDEX (BMI) DOCUMENTED: ICD-10-PCS | Mod: CPTII,,, | Performed by: NURSE PRACTITIONER

## 2022-05-23 RX ORDER — TEMAZEPAM 15 MG/1
15 CAPSULE ORAL NIGHTLY PRN
Qty: 30 CAPSULE | Refills: 3 | Status: SHIPPED | OUTPATIENT
Start: 2022-05-23 | End: 2022-05-23 | Stop reason: ALTCHOICE

## 2022-05-23 RX ORDER — FLUTICASONE PROPIONATE 50 MCG
SPRAY, SUSPENSION (ML) NASAL
COMMUNITY
Start: 2022-01-20

## 2022-05-23 RX ORDER — ESCITALOPRAM OXALATE 5 MG/1
TABLET ORAL
COMMUNITY
Start: 2022-04-04 | End: 2022-05-23 | Stop reason: DRUGHIGH

## 2022-05-23 RX ORDER — ESCITALOPRAM OXALATE 10 MG/1
10 TABLET ORAL DAILY
Qty: 30 TABLET | Refills: 11 | Status: SHIPPED | OUTPATIENT
Start: 2022-05-23 | End: 2023-05-23

## 2022-05-23 RX ORDER — DICLOFENAC SODIUM 75 MG/1
75 TABLET, DELAYED RELEASE ORAL
COMMUNITY
Start: 2022-02-09

## 2022-05-23 NOTE — PROGRESS NOTES
"Patient Name: Dina Beltran   : 1975  MRN: [id number]     SUBJECTIVE:  Dina Beltran is a 46 y.o. female here for Insomnia (Feels like medication not working)    HPI :  22:  Insomnia, Restoril not working anymore.  Insomnia:  Feels like medication not working. Does not take nightly because she is not able to sleep at night.   Anxiety: Feels like lexapro not working, unable to fall asleep.   Pt states she has been unable to take Restoril due to oversedation the next day, is very tired and unable to go to the gym.  Asking for other options including Xanax or Klonopin.  Discussed the side effects with her of each.  She used to take Melatonin years ago which helped but then had to get on Ambien.  She has tried and failed Ambien, Lunesta and now Restoril.  She has never been on Trazodone or Hydroyxzine but does state she has taken Benadryl in the past and slept fine on that.  She feels anxious and like her body is "out of whack".      22: 1 month FU Insomnia  This is a telehealth visit note. Patient was treated using telehealth, real time audio, video, or both according to [facility] protocols. Maria Luisa MORRELL FNP-C, conducted the visit from location identified below. The patient participated in the visit at a non-[facility] location selected by the patient (or patient's representative), identified below. I am licensed in the [facility]. The patient (or patient's representative) stated that they understood and accepted the privacy and security risks to their information at their location and has consented to telephone visit.  Patient was located at patient's home.  Maria Luisa MORRELL FNP-C, was located at [facility] [address].  We stopped Lunesta last visit and started Temazepam which is working. 7.5mg dose she is sleeping.    3/4/22: Insomnia FU-1 month  This is a telehealth visit note. Patient was treated using telehealth, real time audio, video, or both according to [facility] " protocols. Maria Luisa MORRELL FNP-C, conducted the visit from location identified below. The patient participated in the visit at a non-OU location selected by the patient (or patient's representative), identified below. I am licensed in the [facility]. The patient (or patient's representative) stated that they understood and accepted the privacy and security risks to their information at their location and has consented to telephone visit.  Patient was located at patient's home.  Maria Luisa MORRELL FNP-C, was located at [facility] [address].  I prescribed Lunesta last visit because Ambien was not working. Lunesta is working but she only takes 1/2 pill because it causes drowsiness next day.   MMG not done, reordered today  Due for FLP. last one in 2/21: Chol 134, HDL 54, Trig 63, LDL 67  Is taking pill at 6 or 630 and it takes an hour to fall asleep. Sleeping all the way through night. not able to work out because too tired    2/1/22: This is a telehealth visit note. Patient was treated using telehealth, real time audio, video, or both according to [facility] protocols. Maria Luisa MORRELL FNP-C, conducted the visit from location identified below. The patient participated in the visit at a non-OU location selected by the patient (or patient's representative), identified below. I am licensed in the [facility]. The patient (or patient's representative) stated that they understood and accepted the privacy and security risks to their information at their location and has consented to telephone visit.  Patient was located at patient's home.  Maria Luisa MORRELL FNP-C, was located at [address].  Insomnia. Ambien not working. Scheduled appt to discuss alternatives. She stopped Ambien on her own. Has been on Ambien for a long time and feels as though it just isnt working. Sleep onset late and only sleeping a couple of hours a night.    1/3/22: FU  "visit. Pt not seen since April of last year. Working at [employer] and has been really busy working a lot.  FLP in 2/5/21: Chol 134, HDL 54, Trig 63, LDL 67. Last labs in January 2021.  COVID in August.  Anxiety: Lexapro working, does not feel very anxious.   Fibroids: states bleeding and pain lessened. Feels that it is better. Diclofenac helps with pain in abdomen. Takes as needed.   Insomnia: Ambien needs to be refilled. Out x 1 month and has not been able to stay asleep once falling asleep. A little of both she states.     4/28/2021: Here for follow-up.   Anxiety: Lexapro working.  Fibroids: appt with GYN soon. Having multiple periods in a month. Prescribed Naproxen but it is not helping pain. Appt next Wednesday.  Insomnia: 7 years on Ambien    2/8/2021: Here for 1 month follow-up of anxiety. Rx Lexapro 5mg at last visit. NOREEN 12 at last visit. Today NOREEN=1. No nausea.    1/11/2021: Complaints today: c/o mind racing and tremors, trouble sleeping. This has been occurring for a couple of weeks. States she had a situation recently that she thinks is contributing. She feels as though her mind is racing and she is unable to stop it. She also has mild tremor. Has had some mild palpitations as well but denies CP, SOB, n/v/d.        The patient's allergies, medications, history, and problem list were updated as appropriate.    REVIEW OF SYSTEMS:  A comprehensive review of symptoms was completed and negative except as noted in the HPI.    OBJECTIVE:  Vital signs  Vitals:    05/23/22 0749   BP: (!) 140/84   BP Location: Right arm   Patient Position: Sitting   BP Method: Medium (Manual)   Pulse: 61   Resp: 20   Temp: 97.5 °F (36.4 °C)   SpO2: 99%   Weight: 59.1 kg (130 lb 6.4 oz)   Height: 5' 4" (1.626 m)        Physical Exam  Vitals and nursing note reviewed.   HENT:      Head: Normocephalic and atraumatic.   Cardiovascular:      Rate and Rhythm: Normal rate and regular rhythm.      Pulses: Normal pulses.      Heart sounds: " Normal heart sounds.   Pulmonary:      Breath sounds: Normal breath sounds.   Musculoskeletal:         General: Normal range of motion.      Cervical back: Normal range of motion.   Skin:     General: Skin is warm and dry.   Neurological:      General: No focal deficit present.      Mental Status: She is alert and oriented to person, place, and time.   Psychiatric:         Mood and Affect: Mood normal.          ASSESSMENT/PLAN:  1. Insomnia, unspecified type  Assessment & Plan:  Stop Restoril. Will try increase Lexapro and Melatonin and see if this works.  Will return in 3 weeks to reassess.   Avoid caffeine, alcohol and stimulants. Do not use illicit drugs.  Practice positive phrases and repeat throughout the day.  Try yoga, lavender scents or Chamomile tea to promote relaxation.  Set healthy boundaries, avoid people and conversations that increase stress.  Avoid caffeinated beverages after lunch  Avoid alcohol near bedtime (eg, late afternoon and evening)  Avoid smoking or other nicotine intake, particularly during the evening  Exercise regularly for at least 20 minutes, preferably more than four to five hours prior to bedtime  Avoid daytime naps, especially if they are longer than 20 to 30 minutes or occur late in the day  Resolve concerns or worries before bedtime  Try not to force sleep    Sleep Hygiene Techniques: Sleep hygiene refers to actions that tend to improve and maintain good sleep  Power down electronic devices at least one hour prior to bedtime.  Keep room dark; use eye mask or relaxation sound machine to promote rest.  Sleep as long as necessary to feel rested (usually seven to eight hours for adults) and then get out of bed  Maintain a regular sleep schedule, particularly a regular wake-up time in the morning        2. Anxiety  Assessment & Plan:  Increase Lexapro to 10mg po daily. Add Melatonin 10mg nightly to regimen.    Practice deep breathing or abdominal breathing exercises when anxiety  occurs.  Exercise daily. Get sunlight daily.  Avoid caffeine, alcohol and stimulants.  Practice positive phrases and repeat throughout the day, yoga, lavender scents or Chamomile tea will help anxiety.  Set healthy boundaries, avoid people and conversations that increase stress.  Reports any symptoms of suicidal or homicidal ideations immediately, if clinic is closed go to nearest emergency room.        Orders:  -     EScitalopram oxalate (LEXAPRO) 10 MG tablet     No results found for this or any previous visit (from the past 24 hour(s)).      Follow Up:  Follow up in about 3 weeks (around 6/13/2022) for Med Assessment, Virtual Visit.     This note was created with the assistance of a voice recognition software or phone dictation. There may be transcription errors as a result of using this technology however minimal. Effort has been made to assure accuracy of transcription but any obvious errors or omissions should be clarified with the author of the document

## 2022-05-23 NOTE — ASSESSMENT & PLAN NOTE
Increase Lexapro to 10mg po daily. Add Melatonin 10mg nightly to regimen.    Practice deep breathing or abdominal breathing exercises when anxiety occurs.  Exercise daily. Get sunlight daily.  Avoid caffeine, alcohol and stimulants.  Practice positive phrases and repeat throughout the day, yoga, lavender scents or Chamomile tea will help anxiety.  Set healthy boundaries, avoid people and conversations that increase stress.  Reports any symptoms of suicidal or homicidal ideations immediately, if clinic is closed go to nearest emergency room.

## 2022-05-23 NOTE — ASSESSMENT & PLAN NOTE
Stop Restoril. Will try increase Lexapro and Melatonin and see if this works.  Will return in 3 weeks to reassess.   Avoid caffeine, alcohol and stimulants. Do not use illicit drugs.  Practice positive phrases and repeat throughout the day.  Try yoga, lavender scents or Chamomile tea to promote relaxation.  Set healthy boundaries, avoid people and conversations that increase stress.  Avoid caffeinated beverages after lunch  Avoid alcohol near bedtime (eg, late afternoon and evening)  Avoid smoking or other nicotine intake, particularly during the evening  Exercise regularly for at least 20 minutes, preferably more than four to five hours prior to bedtime  Avoid daytime naps, especially if they are longer than 20 to 30 minutes or occur late in the day  Resolve concerns or worries before bedtime  Try not to force sleep    Sleep Hygiene Techniques: Sleep hygiene refers to actions that tend to improve and maintain good sleep  Power down electronic devices at least one hour prior to bedtime.  Keep room dark; use eye mask or relaxation sound machine to promote rest.  Sleep as long as necessary to feel rested (usually seven to eight hours for adults) and then get out of bed  Maintain a regular sleep schedule, particularly a regular wake-up time in the morning

## 2022-05-30 ENCOUNTER — PATIENT OUTREACH (OUTPATIENT)
Dept: ADMINISTRATIVE | Facility: HOSPITAL | Age: 47
End: 2022-05-30
Payer: COMMERCIAL

## 2022-06-20 ENCOUNTER — OFFICE VISIT (OUTPATIENT)
Dept: OPHTHALMOLOGY | Facility: CLINIC | Age: 47
End: 2022-06-20
Payer: COMMERCIAL

## 2022-06-20 VITALS — HEIGHT: 64 IN | BODY MASS INDEX: 22.2 KG/M2 | WEIGHT: 130.06 LBS

## 2022-06-20 DIAGNOSIS — D31.31 CHOROIDAL NEVUS OF RIGHT EYE: ICD-10-CM

## 2022-06-20 DIAGNOSIS — Z86.69 HISTORY OF CORNEAL ULCER: Primary | ICD-10-CM

## 2022-06-20 PROCEDURE — 99212 OFFICE O/P EST SF 10 MIN: CPT | Mod: PBBFAC,PO | Performed by: STUDENT IN AN ORGANIZED HEALTH CARE EDUCATION/TRAINING PROGRAM

## 2022-06-20 PROCEDURE — 92250 FUNDUS PHOTOGRAPHY W/I&R: CPT | Mod: PBBFAC,PO | Performed by: STUDENT IN AN ORGANIZED HEALTH CARE EDUCATION/TRAINING PROGRAM

## 2022-06-20 NOTE — PROGRESS NOTES
Assessment/Plan:  1. History of Corneal Ulcer, left eye  - Patient last seen 11/2020, resolved then  - CTL culture + for serratia liquefaciens  - Residual subepithelial scar  - Last documented 11/2020 GPC resolved today    2. Choroidal nevus, OD  - Fundus photos repeated today  - Within 2 DD of optic nerve, appears unchanged  - Monitor    RTC 1 year annual DFE

## 2022-06-21 PROBLEM — D31.31 CHOROIDAL NEVUS OF RIGHT EYE: Status: ACTIVE | Noted: 2022-06-21

## 2022-09-21 ENCOUNTER — HISTORICAL (OUTPATIENT)
Dept: ADMINISTRATIVE | Facility: HOSPITAL | Age: 47
End: 2022-09-21
Payer: COMMERCIAL

## 2023-07-24 ENCOUNTER — HOSPITAL ENCOUNTER (EMERGENCY)
Facility: HOSPITAL | Age: 48
Discharge: HOME | End: 2023-07-24
Attending: EMERGENCY MEDICINE
Payer: COMMERCIAL

## 2023-07-24 VITALS
BODY MASS INDEX: 21.27 KG/M2 | WEIGHT: 124.56 LBS | HEIGHT: 64 IN | RESPIRATION RATE: 18 BRPM | HEART RATE: 75 BPM | SYSTOLIC BLOOD PRESSURE: 136 MMHG | TEMPERATURE: 98 F | OXYGEN SATURATION: 100 % | DIASTOLIC BLOOD PRESSURE: 89 MMHG

## 2023-07-24 DIAGNOSIS — H53.8 BLURRY VISION: ICD-10-CM

## 2023-07-24 DIAGNOSIS — R51.9 NONINTRACTABLE EPISODIC HEADACHE, UNSPECIFIED HEADACHE TYPE: Primary | ICD-10-CM

## 2023-07-24 LAB
ALBUMIN SERPL-MCNC: 3.6 G/DL (ref 3.5–5)
ALBUMIN/GLOB SERPL: 1.1 RATIO (ref 1.1–2)
ALP SERPL-CCNC: 46 UNIT/L (ref 40–150)
ALT SERPL-CCNC: 19 UNIT/L (ref 0–55)
AMORPH URATE CRY URNS QL MICRO: ABNORMAL /UL
APPEARANCE UR: ABNORMAL
AST SERPL-CCNC: 21 UNIT/L (ref 5–34)
BACTERIA #/AREA URNS AUTO: ABNORMAL /HPF
BASOPHILS # BLD AUTO: 0.04 X10(3)/MCL
BASOPHILS NFR BLD AUTO: 0.6 %
BILIRUB UR QL STRIP.AUTO: NEGATIVE
BILIRUBIN DIRECT+TOT PNL SERPL-MCNC: 0.3 MG/DL
BUN SERPL-MCNC: 18.5 MG/DL (ref 9.8–20.1)
CALCIUM SERPL-MCNC: 8.7 MG/DL (ref 8.4–10.2)
CHLORIDE SERPL-SCNC: 106 MMOL/L (ref 98–107)
CO2 SERPL-SCNC: 26 MMOL/L (ref 22–29)
COLOR UR: YELLOW
CREAT SERPL-MCNC: 0.87 MG/DL (ref 0.55–1.02)
EOSINOPHIL # BLD AUTO: 0.06 X10(3)/MCL (ref 0–0.9)
EOSINOPHIL NFR BLD AUTO: 0.9 %
ERYTHROCYTE [DISTWIDTH] IN BLOOD BY AUTOMATED COUNT: 13 % (ref 11.5–17)
GFR SERPLBLD CREATININE-BSD FMLA CKD-EPI: >60 MLS/MIN/1.73/M2
GLOBULIN SER-MCNC: 3.3 GM/DL (ref 2.4–3.5)
GLUCOSE SERPL-MCNC: 86 MG/DL (ref 74–100)
GLUCOSE UR QL STRIP.AUTO: NORMAL
HCT VFR BLD AUTO: 36.1 % (ref 37–47)
HGB BLD-MCNC: 12.1 G/DL (ref 12–16)
HYALINE CASTS #/AREA URNS LPF: ABNORMAL /LPF
IMM GRANULOCYTES # BLD AUTO: 0.01 X10(3)/MCL (ref 0–0.04)
IMM GRANULOCYTES NFR BLD AUTO: 0.1 %
KETONES UR QL STRIP.AUTO: NEGATIVE
LEUKOCYTE ESTERASE UR QL STRIP.AUTO: NEGATIVE
LYMPHOCYTES # BLD AUTO: 2.18 X10(3)/MCL (ref 0.6–4.6)
LYMPHOCYTES NFR BLD AUTO: 32.6 %
MAGNESIUM SERPL-MCNC: 1.9 MG/DL (ref 1.6–2.6)
MCH RBC QN AUTO: 34.1 PG (ref 27–31)
MCHC RBC AUTO-ENTMCNC: 33.5 G/DL (ref 33–36)
MCV RBC AUTO: 101.7 FL (ref 80–94)
MONOCYTES # BLD AUTO: 0.57 X10(3)/MCL (ref 0.1–1.3)
MONOCYTES NFR BLD AUTO: 8.5 %
MUCOUS THREADS URNS QL MICRO: ABNORMAL /LPF
NEUTROPHILS # BLD AUTO: 3.82 X10(3)/MCL (ref 2.1–9.2)
NEUTROPHILS NFR BLD AUTO: 57.3 %
NITRITE UR QL STRIP.AUTO: NEGATIVE
NRBC BLD AUTO-RTO: 0 %
PH UR STRIP.AUTO: 7 [PH]
PLATELET # BLD AUTO: 205 X10(3)/MCL (ref 130–400)
PMV BLD AUTO: 10.4 FL (ref 7.4–10.4)
POTASSIUM SERPL-SCNC: 3.6 MMOL/L (ref 3.5–5.1)
PROT SERPL-MCNC: 6.9 GM/DL (ref 6.4–8.3)
PROT UR QL STRIP.AUTO: ABNORMAL
RBC # BLD AUTO: 3.55 X10(6)/MCL (ref 4.2–5.4)
RBC #/AREA URNS AUTO: ABNORMAL /HPF
RBC UR QL AUTO: NEGATIVE
SODIUM SERPL-SCNC: 139 MMOL/L (ref 136–145)
SP GR UR STRIP.AUTO: 1.03
SQUAMOUS #/AREA URNS LPF: ABNORMAL /HPF
TROPONIN I SERPL-MCNC: <0.01 NG/ML (ref 0–0.04)
UROBILINOGEN UR STRIP-ACNC: ABNORMAL
WBC # SPEC AUTO: 6.68 X10(3)/MCL (ref 4.5–11.5)
WBC #/AREA URNS AUTO: ABNORMAL /HPF

## 2023-07-24 PROCEDURE — 85025 COMPLETE CBC W/AUTO DIFF WBC: CPT | Performed by: PHYSICIAN ASSISTANT

## 2023-07-24 PROCEDURE — 99285 EMERGENCY DEPT VISIT HI MDM: CPT | Mod: 25

## 2023-07-24 PROCEDURE — 80053 COMPREHEN METABOLIC PANEL: CPT | Performed by: PHYSICIAN ASSISTANT

## 2023-07-24 PROCEDURE — 93005 ELECTROCARDIOGRAM TRACING: CPT

## 2023-07-24 PROCEDURE — 83735 ASSAY OF MAGNESIUM: CPT | Performed by: PHYSICIAN ASSISTANT

## 2023-07-24 PROCEDURE — 81001 URINALYSIS AUTO W/SCOPE: CPT | Performed by: PHYSICIAN ASSISTANT

## 2023-07-24 PROCEDURE — 84484 ASSAY OF TROPONIN QUANT: CPT | Performed by: PHYSICIAN ASSISTANT

## 2023-07-24 NOTE — ED PROVIDER NOTES
Encounter Date: 7/24/2023       History     Chief Complaint   Patient presents with    Headache     HA and blurred vision this morning; subjective left facial numbness. No arm drift, no facial droop, no slurred speech.     Headache, hypertension; chronic headaches for which no evaluation undertaken; this week she observes nausea      Headache   This is a recurrent problem. The current episode started more than 1 year ago. The problem occurs intermittently. The problem has been waxing and waning. The pain is located in the Retro-orbital region. The pain does not radiate. The pain quality is similar to prior headaches. The quality of the pain is described as throbbing. The pain is at a severity of 4/10. Associated symptoms include nausea. Pertinent negatives include no abdominal pain, abnormal behavior, anorexia, back pain, blurred vision, coughing, dizziness, drainage, ear pain, eye pain, eye redness or eye watering. The symptoms are aggravated by bright light and noise. She has tried acetaminophen for the symptoms. The treatment provided mild relief. Her past medical history is significant for hypertension and migraines in the family. There is no history of cancer or cluster headaches.   Review of patient's allergies indicates:  No Known Allergies  Past Medical History:   Diagnosis Date    Anxiety     Cornea scar     Depression      Past Surgical History:   Procedure Laterality Date    TUBAL LIGATION       History reviewed. No pertinent family history.  Social History     Tobacco Use    Smoking status: Some Days     Types: Vaping with nicotine    Smokeless tobacco: Never   Substance Use Topics    Alcohol use: Yes     Alcohol/week: 2.0 standard drinks     Types: 2 Glasses of wine per week    Drug use: Never     Review of Systems   HENT:  Negative for ear pain.    Eyes:  Negative for blurred vision, pain and redness.   Respiratory:  Negative for cough.    Gastrointestinal:  Positive for nausea. Negative for abdominal  pain and anorexia.   Musculoskeletal:  Negative for back pain.   Neurological:  Positive for headaches. Negative for dizziness.   All other systems reviewed and are negative.    Physical Exam     Initial Vitals [07/24/23 1412]   BP Pulse Resp Temp SpO2   (!) 186/97 85 16 98.4 °F (36.9 °C) 99 %      MAP       --         Physical Exam    Nursing note and vitals reviewed.  Constitutional: She appears well-developed and well-nourished.   HENT:   Head: Normocephalic and atraumatic.   Eyes: Conjunctivae and EOM are normal. Pupils are equal, round, and reactive to light.   Neck: No tracheal deviation present.   Normal range of motion.  Cardiovascular:  Normal rate, regular rhythm and normal heart sounds.           Pulmonary/Chest: Breath sounds normal. No respiratory distress. She exhibits no tenderness.   Abdominal: Abdomen is soft. She exhibits no distension. There is no abdominal tenderness. There is no rebound.   Musculoskeletal:         General: No tenderness. Normal range of motion.      Cervical back: Normal range of motion.     Neurological: She is alert and oriented to person, place, and time. GCS score is 15. GCS eye subscore is 4. GCS verbal subscore is 5. GCS motor subscore is 6.   Skin: No rash and no abscess noted.   Psychiatric: She has a normal mood and affect. Her behavior is normal. Judgment and thought content normal.       ED Course   Procedures  Labs Reviewed   URINALYSIS, REFLEX TO URINE CULTURE - Abnormal; Notable for the following components:       Result Value    Appearance, UA Hazy (*)     Protein, UA 1+ (*)     Urobilinogen, UA 1+ (*)     Bacteria, UA Few (*)     Squamous Epithelial Cells, UA Occ (*)     Mucous, UA Trace (*)     Hyaline Casts, UA 0-2 (*)     All other components within normal limits   CBC WITH DIFFERENTIAL - Abnormal; Notable for the following components:    RBC 3.55 (*)     Hct 36.1 (*)     .7 (*)     MCH 34.1 (*)     All other components within normal limits   TROPONIN I  - Normal   MAGNESIUM - Normal   CBC W/ AUTO DIFFERENTIAL    Narrative:     The following orders were created for panel order CBC Auto Differential.  Procedure                               Abnormality         Status                     ---------                               -----------         ------                     CBC with Differential[[id number]]        Abnormal            Final result                 Please view results for these tests on the individual orders.   COMPREHENSIVE METABOLIC PANEL   EXTRA TUBES    Narrative:     The following orders were created for panel order EXTRA TUBES.  Procedure                               Abnormality         Status                     ---------                               -----------         ------                     Light Blue Top Hold[[id number]]                              In process                 Red Top Hold[[id number]]                                     In process                   Please view results for these tests on the individual orders.   LIGHT BLUE TOP HOLD   RED TOP HOLD        ECG Results              EKG 12-lead (In process)  Result time 07/24/23 14:35:43      In process by Interface, Lab In [facility] (07/24/23 14:35:43)                   Narrative:    Test Reason : H53.8,    Vent. Rate : 071 BPM     Atrial Rate : 071 BPM     P-R Int : 122 ms          QRS Dur : 074 ms      QT Int : 422 ms       P-R-T Axes : 066 064 065 degrees     QTc Int : 458 ms    Normal sinus rhythm  Normal ECG  No previous ECGs available    Referred By: [email or web address]   SELF           Confirmed By:                                   Imaging Results              CT Head Without Contrast (Final result)  Result time 07/24/23 15:33:07      Final result by Rubén Conway MD (07/24/23 15:33:07)                   Impression:      No acute intracranial findings.      Electronically signed by: Rubén Conway  Date:    07/24/2023  Time:    15:33               Narrative:    EXAMINATION:  CT HEAD  WITHOUT CONTRAST    CLINICAL HISTORY:  Headache, chronic, new features or increased frequency;    TECHNIQUE:  CT imaging of the head performed from the skull base to the vertex without intravenous contrast.  mGycm. Automatic exposure control, adjustment of mA/kV or iterative reconstruction technique was used to reduce radiation.    COMPARISON:  None Available.    FINDINGS:  There is no acute cortical infarct, hemorrhage or mass lesion.  The ventricles are normal in size.    Visualized paranasal sinuses and mastoid air cells are clear.                                    X-Rays:   Independently Interpreted Readings:   Head CT: Negative ct head ;   Medications - No data to display  Medical Decision Making:   Initial Assessment:   Patient presents today with headache, nausea.  Patient does endorse several similar episodes in the past but she is concerned that they may be increasing in frequency and severity.  Her blood pressure is considerably elevated today.  It does seem possible the elevated blood pressure is related to anxiety and pain but risk is found sufficient to warrant an expanded evaluation to diminish the probability emergent causes of headache remain occult.  Differential Diagnosis:   Migraine, cluster, tension would be more likely causes of the symptom complex but risk of occult CNS neoplasm versus intracranial bleeding diathesis sufficient to warrant expanded evaluation.  Independently Interpreted Test(s):   I have ordered and independently interpreted X-rays - see prior notes.  I have ordered and independently interpreted EKG Reading(s) - see prior notes  Clinical Tests:   Lab Tests: Ordered and Reviewed  Radiological Study: Ordered and Reviewed  Medical Tests: Ordered and Reviewed  ED Management:  Patient is improved over course in the emergency department with conservative interventions.  Objective data are resulted in found reassuring.  Patient is discharged home with anticipatory guidance,  return precautions, follow-up instructions.  Home in stable condition without event.           ED Course as of 07/24/23 1704   Mon Jul 24, 2023   1551 Contaminated ua, unconvincing as uti ; [CT]   1551 Reassuring hemogram ; [CT]   1629 Normal magnesium ; [CT]   1629 Reassuring chemistries ; [CT]   1629 Negative troponin ; [CT]      ED Course User Index  [CT] Bo Zaman MD                   Clinical Impression:   Final diagnoses:  [H53.8] Blurry vision  [R51.9] Nonintractable episodic headache, unspecified headache type (Primary)        ED Disposition Condition    Discharge Stable          ED Prescriptions    None       Follow-up Information       Follow up With Specialties Details Why Contact Info    [facility] Emergency Medicine  As needed, If symptoms worsen [address] [place] [address] [phone]             Bo Zaman MD  07/24/23 3831       Bo Zaman MD  07/24/23 9066

## 2023-09-15 NOTE — FIRST PROVIDER EVALUATION
Bed: ED05  Expected date: 9/14/23  Expected time: 7:45 PM  Means of arrival: Ambulance  Comments:  Shelley 1 89M fall    Medical screening examination initiated.  I have conducted a focused provider triage encounter, findings are as follows:    Brief history of present illness:  48 year old female with left sided facial numbness, headache, blurry vision.    There were no vitals filed for this visit.    Pertinent physical exam:      Brief workup plan:  diagnostic testing if indicated     Preliminary workup initiated; this workup will be continued and followed by the physician or advanced practice provider that is assigned to the patient when roomed.

## 2023-10-17 ENCOUNTER — HOSPITAL ENCOUNTER (EMERGENCY)
Facility: HOSPITAL | Age: 48
Discharge: HOME | End: 2023-10-17
Attending: STUDENT IN AN ORGANIZED HEALTH CARE EDUCATION/TRAINING PROGRAM
Payer: COMMERCIAL

## 2023-10-17 VITALS
WEIGHT: 125.69 LBS | RESPIRATION RATE: 16 BRPM | DIASTOLIC BLOOD PRESSURE: 82 MMHG | BODY MASS INDEX: 21.57 KG/M2 | TEMPERATURE: 98 F | HEART RATE: 80 BPM | OXYGEN SATURATION: 100 % | SYSTOLIC BLOOD PRESSURE: 156 MMHG

## 2023-10-17 DIAGNOSIS — N93.8 DUB (DYSFUNCTIONAL UTERINE BLEEDING): Primary | ICD-10-CM

## 2023-10-17 DIAGNOSIS — N95.1 PERIMENOPAUSE: ICD-10-CM

## 2023-10-17 LAB
ALBUMIN SERPL-MCNC: 3.8 G/DL (ref 3.5–5)
ALBUMIN/GLOB SERPL: 1.1 RATIO (ref 1.1–2)
ALP SERPL-CCNC: 48 UNIT/L (ref 40–150)
ALT SERPL-CCNC: 17 UNIT/L (ref 0–55)
APPEARANCE UR: CLEAR
AST SERPL-CCNC: 21 UNIT/L (ref 5–34)
BACTERIA #/AREA URNS AUTO: ABNORMAL /HPF
BASOPHILS # BLD AUTO: 0.03 X10(3)/MCL
BASOPHILS NFR BLD AUTO: 0.6 %
BILIRUB SERPL-MCNC: 0.3 MG/DL
BILIRUB UR QL STRIP.AUTO: NEGATIVE
BUN SERPL-MCNC: 11.3 MG/DL (ref 9.8–20.1)
CALCIUM SERPL-MCNC: 8.8 MG/DL (ref 8.4–10.2)
CHLORIDE SERPL-SCNC: 108 MMOL/L (ref 98–107)
CLUE CELLS VAG QL WET PREP: NORMAL
CO2 SERPL-SCNC: 26 MMOL/L (ref 22–29)
COLOR UR AUTO: COLORLESS
CREAT SERPL-MCNC: 0.71 MG/DL (ref 0.55–1.02)
EOSINOPHIL # BLD AUTO: 0.04 X10(3)/MCL (ref 0–0.9)
EOSINOPHIL NFR BLD AUTO: 0.7 %
ERYTHROCYTE [DISTWIDTH] IN BLOOD BY AUTOMATED COUNT: 12.8 % (ref 11.5–17)
GFR SERPLBLD CREATININE-BSD FMLA CKD-EPI: >60 MLS/MIN/1.73/M2
GLOBULIN SER-MCNC: 3.4 GM/DL (ref 2.4–3.5)
GLUCOSE SERPL-MCNC: 68 MG/DL (ref 74–100)
GLUCOSE UR QL STRIP.AUTO: NORMAL
HCT VFR BLD AUTO: 36.8 % (ref 37–47)
HGB BLD-MCNC: 12.5 G/DL (ref 12–16)
HYALINE CASTS #/AREA URNS LPF: ABNORMAL /LPF
IMM GRANULOCYTES # BLD AUTO: 0.02 X10(3)/MCL (ref 0–0.04)
IMM GRANULOCYTES NFR BLD AUTO: 0.4 %
KETONES UR QL STRIP.AUTO: NEGATIVE
LEUKOCYTE ESTERASE UR QL STRIP.AUTO: NEGATIVE
LYMPHOCYTES # BLD AUTO: 1.75 X10(3)/MCL (ref 0.6–4.6)
LYMPHOCYTES NFR BLD AUTO: 32.3 %
MCH RBC QN AUTO: 36 PG (ref 27–31)
MCHC RBC AUTO-ENTMCNC: 34 G/DL (ref 33–36)
MCV RBC AUTO: 106.1 FL (ref 80–94)
MONOCYTES # BLD AUTO: 0.33 X10(3)/MCL (ref 0.1–1.3)
MONOCYTES NFR BLD AUTO: 6.1 %
MUCOUS THREADS URNS QL MICRO: ABNORMAL /LPF
NEUTROPHILS # BLD AUTO: 3.24 X10(3)/MCL (ref 2.1–9.2)
NEUTROPHILS NFR BLD AUTO: 59.9 %
NITRITE UR QL STRIP.AUTO: NEGATIVE
NRBC BLD AUTO-RTO: 0 %
PH UR STRIP.AUTO: 5 [PH]
PLATELET # BLD AUTO: 229 X10(3)/MCL (ref 130–400)
PMV BLD AUTO: 9.4 FL (ref 7.4–10.4)
POTASSIUM SERPL-SCNC: 3.5 MMOL/L (ref 3.5–5.1)
PROT SERPL-MCNC: 7.2 GM/DL (ref 6.4–8.3)
PROT UR QL STRIP.AUTO: NEGATIVE
RBC # BLD AUTO: 3.47 X10(6)/MCL (ref 4.2–5.4)
RBC #/AREA URNS AUTO: ABNORMAL /HPF
RBC UR QL AUTO: NEGATIVE
SODIUM SERPL-SCNC: 139 MMOL/L (ref 136–145)
SP GR UR STRIP.AUTO: 1 (ref 1–1.03)
SQUAMOUS #/AREA URNS LPF: ABNORMAL /HPF
T VAGINALIS VAG QL WET PREP: NORMAL
UROBILINOGEN UR STRIP-ACNC: NORMAL
WBC # SPEC AUTO: 5.41 X10(3)/MCL (ref 4.5–11.5)
WBC #/AREA URNS AUTO: ABNORMAL /HPF
WBC #/AREA VAG WET PREP: NORMAL
YEAST SPEC QL WET PREP: NORMAL

## 2023-10-17 PROCEDURE — 87210 SMEAR WET MOUNT SALINE/INK: CPT | Performed by: PHYSICIAN ASSISTANT

## 2023-10-17 PROCEDURE — 99283 EMERGENCY DEPT VISIT LOW MDM: CPT

## 2023-10-17 PROCEDURE — 81001 URINALYSIS AUTO W/SCOPE: CPT | Performed by: PHYSICIAN ASSISTANT

## 2023-10-17 PROCEDURE — 85025 COMPLETE CBC W/AUTO DIFF WBC: CPT | Performed by: PHYSICIAN ASSISTANT

## 2023-10-17 PROCEDURE — 80053 COMPREHEN METABOLIC PANEL: CPT | Performed by: PHYSICIAN ASSISTANT

## 2023-10-17 NOTE — DISCHARGE INSTRUCTIONS
Take all medications as prescribed.     Drink plenty of fluids and get a lot of rest.     Follow up with GYN, they will call you with an appointment.    Return to ER for any changes or worsening of symptoms.

## 2023-10-17 NOTE — ED PROVIDER NOTES
Encounter Date: 10/17/2023       History     Chief Complaint   Patient presents with    Vaginal Bleeding     PT W CO VAG BLEEDING W ODOR X 2 WKS.  NO SOB. VSS.  LIGHT FLOW AT PRESENT.  CO MILD DYSURIA.      47 YO WF in ER with complaints of abnormal uterine bleeding X 2 weeks. States she has been having regular menstrual cycles until this month. Only light bleeding today but it was heavy for several days. Hx of anemia. Denies fever, chills, chest pain, SOB, abdominal pain, N/V/D, HA or dizziness. No other complaints.     The history is provided by the patient.     Review of patient's allergies indicates:  No Known Allergies  Past Medical History:   Diagnosis Date    Anxiety     Cornea scar     Depression      Past Surgical History:   Procedure Laterality Date    TUBAL LIGATION       History reviewed. No pertinent family history.  Social History     Tobacco Use    Smoking status: Some Days     Types: Vaping with nicotine    Smokeless tobacco: Never   Substance Use Topics    Alcohol use: Yes     Alcohol/week: 2.0 standard drinks of alcohol     Types: 2 Glasses of wine per week    Drug use: Never     Review of Systems   Constitutional:  Negative for chills and fever.   HENT:  Negative for congestion and sore throat.    Respiratory:  Negative for shortness of breath.    Cardiovascular:  Negative for chest pain.   Gastrointestinal:  Negative for abdominal pain, diarrhea, nausea and vomiting.   Genitourinary:  Positive for menstrual problem and vaginal bleeding. Negative for dysuria, pelvic pain, vaginal discharge and vaginal pain.   Musculoskeletal:  Negative for back pain.   Skin:  Negative for rash.   Neurological:  Negative for dizziness, weakness, light-headedness and headaches.   Hematological:  Does not bruise/bleed easily.   All other systems reviewed and are negative.      Physical Exam     Initial Vitals [10/17/23 0950]   BP Pulse Resp Temp SpO2   (!) 156/82 80 16 97.9 °F (36.6 °C) 100 %      MAP       --          Physical Exam    Nursing note and vitals reviewed.  Constitutional: She appears well-developed and well-nourished. She is not diaphoretic. No distress.   HENT:   Head: Normocephalic and atraumatic.   Mouth/Throat: Oropharynx is clear and moist.   Eyes: Conjunctivae are normal.   Cardiovascular:  Normal rate, regular rhythm, normal heart sounds and intact distal pulses.           Pulmonary/Chest: Breath sounds normal.   Abdominal: Abdomen is soft.   Genitourinary:    Pelvic exam was performed with patient supine.   There is no rash, tenderness or lesion on the right labia. There is no rash, tenderness or lesion on the left labia. Cervix exhibits no discharge and no friability.    Vaginal bleeding (mild blood in vault) present.      No vaginal discharge, erythema or tenderness.   There is bleeding (mild blood in vault) in the vagina. No erythema or tenderness in the vagina.    No foreign body in the vagina.     Musculoskeletal:         General: Normal range of motion.     Neurological: She is alert and oriented to person, place, and time. She has normal strength.   Skin: Skin is warm and dry.   Psychiatric: She has a normal mood and affect.         ED Course   Procedures  Labs Reviewed   COMPREHENSIVE METABOLIC PANEL - Abnormal; Notable for the following components:       Result Value    Chloride 108 (*)     Glucose Level 68 (*)     All other components within normal limits   URINALYSIS, REFLEX TO URINE CULTURE - Abnormal; Notable for the following components:    Color, UA Colorless (*)     Mucous, UA Trace (*)     All other components within normal limits   CBC WITH DIFFERENTIAL - Abnormal; Notable for the following components:    RBC 3.47 (*)     Hct 36.8 (*)     .1 (*)     MCH 36.0 (*)     All other components within normal limits   WET PREP, GENITAL - Normal   CBC W/ AUTO DIFFERENTIAL    Narrative:     The following orders were created for panel order CBC auto differential.  Procedure                                Abnormality         Status                     ---------                               -----------         ------                     CBC with Differential[[id number]]        Abnormal            Final result                 Please view results for these tests on the individual orders.   EXTRA TUBES    Narrative:     The following orders were created for panel order EXTRA TUBES.  Procedure                               Abnormality         Status                     ---------                               -----------         ------                     Light Blue Top Hold[[id number]]                              In process                 Red Top Hold[[id number]]                                     In process                 Gold Top Hold[[id number]]                                    In process                   Please view results for these tests on the individual orders.   LIGHT BLUE TOP HOLD   RED TOP HOLD   GOLD TOP HOLD          Imaging Results    None          Medications - No data to display  Medical Decision Making  Amount and/or Complexity of Data Reviewed  Labs: ordered.      Additional MDM:   Differential Diagnosis:   Other: The following diagnoses were also considered and will be evaluated: AUB, menopause and anemia. VSS, NAD, pt is non-toxic or ill appearing, labs reviewed with pt, treatment plan and discharge instructions including follow up discussed, pt verbalized understanding, all questions answered, pt is stable and ready for discharge        APC / Resident Notes:   I was not physically present during the history, exam or disposition of this patient. I was available at all times for consultation. (Zmora)                        Clinical Impression:   Final diagnoses:  [N93.8] DUB (dysfunctional uterine bleeding) (Primary)  [N95.1] Perimenopause        ED Disposition Condition    Discharge Stable          ED Prescriptions    None       Follow-up Information       Follow up With Specialties  Details Why Contact Info    [facility] Emergency Medicine In 3 days As needed, If symptoms worsen [address] [facility] [address] [phone]    [facility] GYN Gynecology  they will call you with an appointment [address] [facility] [address] [phone]    Primary Care Provider  Schedule an appointment as soon as possible for a visit in 5 days  Call a PCP to make an appointment for follow up within 3-5  days.  You can call the phone number on the back of your insurance card for accepting providers as discussed.             Sherif Ordonez PA  10/18/23 1009       Daljit Pak MD  10/18/23 7254

## 2023-10-23 ENCOUNTER — HOSPITAL ENCOUNTER (OUTPATIENT)
Dept: RADIOLOGY | Facility: HOSPITAL | Age: 48
Discharge: HOME | End: 2023-10-23
Attending: NURSE PRACTITIONER
Payer: COMMERCIAL

## 2023-10-23 DIAGNOSIS — Z12.31 BREAST CANCER SCREENING BY MAMMOGRAM: ICD-10-CM

## 2023-10-23 PROCEDURE — 25500020 PHARM REV CODE 255

## 2023-10-23 PROCEDURE — 77067 SCR MAMMO BI INCL CAD: CPT | Mod: TC

## 2023-10-23 PROCEDURE — 77067 SCR MAMMO BI INCL CAD: CPT | Mod: 26,,, | Performed by: RADIOLOGY

## 2023-10-23 PROCEDURE — 77067 MAMMO DIGITAL SCREENING WITH CONTRAST, BILATERAL: ICD-10-PCS | Mod: 26,,, | Performed by: RADIOLOGY

## 2023-10-23 RX ADMIN — IOHEXOL 100 ML: 350 INJECTION, SOLUTION INTRAVENOUS at 01:10

## 2023-10-26 ENCOUNTER — TELEPHONE (OUTPATIENT)
Dept: INTERNAL MEDICINE | Facility: CLINIC | Age: 48
End: 2023-10-26
Payer: COMMERCIAL

## 2023-10-26 NOTE — TELEPHONE ENCOUNTER
----- Message from Adria Fields MD sent at 10/24/2023  9:06 AM CDT -----  Patient is on chronic Adderall, we do not prescribe this medication.  And Danelle Zhao is a primary care provider is there reason why they are wanting to change care?  ----- Message -----  From: Nikkie Willis  Sent: 10/24/2023   7:58 AM CDT  To: Adria Fields MD    Referral for New Pt from NP Danelle Zhao

## 2023-12-08 ENCOUNTER — HOSPITAL ENCOUNTER (EMERGENCY)
Facility: HOSPITAL | Age: 48
Discharge: HOME | End: 2023-12-08
Attending: EMERGENCY MEDICINE
Payer: COMMERCIAL

## 2023-12-08 VITALS
RESPIRATION RATE: 20 BRPM | DIASTOLIC BLOOD PRESSURE: 83 MMHG | OXYGEN SATURATION: 99 % | SYSTOLIC BLOOD PRESSURE: 139 MMHG | WEIGHT: 123 LBS | TEMPERATURE: 98 F | HEIGHT: 64 IN | BODY MASS INDEX: 21 KG/M2 | HEART RATE: 72 BPM

## 2023-12-08 DIAGNOSIS — R21 RASH: Primary | ICD-10-CM

## 2023-12-08 PROCEDURE — 99282 EMERGENCY DEPT VISIT SF MDM: CPT

## 2023-12-08 RX ORDER — CLOTRIMAZOLE AND BETAMETHASONE DIPROPIONATE 10; .64 MG/G; MG/G
CREAM TOPICAL 2 TIMES DAILY
Qty: 45 G | Refills: 0 | Status: SHIPPED | OUTPATIENT
Start: 2023-12-08 | End: 2023-12-15

## 2023-12-08 NOTE — ED PROVIDER NOTES
Encounter Date: 12/8/2023       History     Chief Complaint   Patient presents with    Rash     C/o rash left scapula and right scapula since yesterday     The patient presents with rash.  The onset was 1 day ago.  The course/duration of symptoms is constant.  Location: left posterior shoulder and small area right upper back. The character of symptoms is itching.  Radiating symptom: none.  The degree of symptoms is minimal.  Risk factors consist of none.  Prior episodes: none.  Associated symptoms: denies fever, denies chills and denies shortness of breath.          Review of patient's allergies indicates:  No Known Allergies  Past Medical History:   Diagnosis Date    ADHD     Anxiety     Cornea scar     Depression     HTN (hypertension)      Past Surgical History:   Procedure Laterality Date    TUBAL LIGATION       Family History   Problem Relation Age of Onset    Hypertension Father      Social History     Tobacco Use    Smoking status: Some Days     Types: Vaping with nicotine    Smokeless tobacco: Never   Substance Use Topics    Alcohol use: Yes     Alcohol/week: 2.0 standard drinks of alcohol     Types: 2 Glasses of wine per week    Drug use: Never     Review of Systems   Constitutional:  Negative for fever.   HENT:  Negative for sore throat.    Respiratory:  Negative for shortness of breath.    Cardiovascular:  Negative for chest pain.   Gastrointestinal:  Negative for nausea.   Genitourinary:  Negative for dysuria.   Musculoskeletal:  Negative for back pain.   Skin:  Positive for rash.   Neurological:  Negative for weakness.   Hematological:  Does not bruise/bleed easily.   All other systems reviewed and are negative.      Physical Exam     Initial Vitals [12/08/23 1421]   BP Pulse Resp Temp SpO2   139/83 72 20 97.5 °F (36.4 °C) 99 %      MAP       --         Physical Exam    Nursing note and vitals reviewed.  Constitutional: She appears well-developed and well-nourished.   HENT:   Head: Normocephalic and  atraumatic.   Neck: Neck supple.   Normal range of motion.  Cardiovascular:  Normal rate, regular rhythm, normal heart sounds and intact distal pulses.           Pulmonary/Chest: Breath sounds normal.   Abdominal: Abdomen is soft. Bowel sounds are normal.   Musculoskeletal:         General: Normal range of motion.      Cervical back: Normal range of motion and neck supple.     Neurological: She is alert. She has normal strength.   Skin: Skin is warm and dry.   Area of maculopapular rash to posterior left shoulder and small area to right upper back   Psychiatric: She has a normal mood and affect.         ED Course   Procedures  Labs Reviewed - No data to display       Imaging Results    None          Medications - No data to display  Medical Decision Making  The patient presents with rash.  The onset was 1 day ago.  The course/duration of symptoms is constant.  Location: left posterior shoulder and small area right upper back. The character of symptoms is itching.  Radiating symptom: none.  The degree of symptoms is minimal.  Risk factors consist of none.  Prior episodes: none.  Associated symptoms: denies fever, denies chills and denies shortness of breath.      2:55 PM DISPOSITION: The patient is resting comfortably in no acute distress.  She is hemodynamically stable and is without objective evidence for acute process requiring urgent intervention or hospitalization. I provided counseling to patient with regard to condition, the treatment plan, specific conditions for return, and the importance of follow up. Detailed written and verbal instructions provided to patient and she expressed a verbal understanding of the discharge instructions and overall management plan. Reiterated the importance of medication administration and safety and advised patient to follow up with primary care provider in 3-5 days or sooner if needed.  Answered questions at this time. The patient is stable for discharge.         Additional  MDM:   Differential Diagnosis:   Contact dermatitis, eczema, intertrigo among others                                     Clinical Impression:  Final diagnoses:  [R21] Rash (Primary)          ED Disposition Condition    Discharge Stable          ED Prescriptions       Medication Sig Dispense Start Date End Date Auth. Provider    clotrimazole-betamethasone 1-0.05% (LOTRISONE) cream Apply topically 2 (two) times daily. Apply to rash for 7 days 45 g 12/8/2023 12/15/2023 Romel Romo ACNP          Follow-up Information       Follow up With Specialties Details Why Contact Info    Danelle Zhao, FNP Family Medicine In 3 days  [address] [facility]  [address]  [place] [place] [address]  [phone]      [facility] Emergency Medicine  If symptoms worsen [address] [facility] [address] [phone]             Romel Romo ACNP  12/08/23 9343

## 2023-12-09 ENCOUNTER — HOSPITAL ENCOUNTER (EMERGENCY)
Facility: HOSPITAL | Age: 48
Discharge: HOME | End: 2023-12-09
Attending: EMERGENCY MEDICINE
Payer: COMMERCIAL

## 2023-12-09 VITALS
HEIGHT: 64 IN | HEART RATE: 107 BPM | OXYGEN SATURATION: 99 % | RESPIRATION RATE: 20 BRPM | WEIGHT: 119.69 LBS | BODY MASS INDEX: 20.43 KG/M2 | DIASTOLIC BLOOD PRESSURE: 89 MMHG | TEMPERATURE: 100 F | SYSTOLIC BLOOD PRESSURE: 152 MMHG

## 2023-12-09 DIAGNOSIS — R21 RASH: Primary | ICD-10-CM

## 2023-12-09 PROCEDURE — 96372 THER/PROPH/DIAG INJ SC/IM: CPT | Performed by: PHYSICIAN ASSISTANT

## 2023-12-09 PROCEDURE — 99284 EMERGENCY DEPT VISIT MOD MDM: CPT

## 2023-12-09 PROCEDURE — 63600175 PHARM REV CODE 636 W HCPCS: Performed by: PHYSICIAN ASSISTANT

## 2023-12-09 RX ORDER — PREDNISONE 10 MG/1
10 TABLET ORAL DAILY
Qty: 4 TABLET | Refills: 0 | Status: SHIPPED | OUTPATIENT
Start: 2023-12-09 | End: 2023-12-13

## 2023-12-09 RX ORDER — DEXAMETHASONE SODIUM PHOSPHATE 4 MG/ML
8 INJECTION, SOLUTION INTRA-ARTICULAR; INTRALESIONAL; INTRAMUSCULAR; INTRAVENOUS; SOFT TISSUE
Status: COMPLETED | OUTPATIENT
Start: 2023-12-09 | End: 2023-12-09

## 2023-12-09 RX ORDER — HYDROXYZINE PAMOATE 25 MG/1
25 CAPSULE ORAL EVERY 8 HOURS PRN
Qty: 15 CAPSULE | Refills: 0 | Status: SHIPPED | OUTPATIENT
Start: 2023-12-09

## 2023-12-09 RX ADMIN — DEXAMETHASONE SODIUM PHOSPHATE 8 MG: 4 INJECTION, SOLUTION INTRA-ARTICULAR; INTRALESIONAL; INTRAMUSCULAR; INTRAVENOUS; SOFT TISSUE at 11:12

## 2023-12-09 NOTE — ED PROVIDER NOTES
Encounter Date: 12/9/2023       History     Chief Complaint   Patient presents with    Rash     States rash has formed blisters despite use of steroid cream     48 year old female, presents to the emergency department with complaints rash that is forming blisters.    She states she was seen yesterday for rash was prescribed a cream.   Patient states she is applying cream but rash is spreading to her arm and chest.   She denies drainage, fever, chills, SOB.      The history is provided by the patient. No  was used.     Review of patient's allergies indicates:  No Known Allergies  Past Medical History:   Diagnosis Date    ADHD     Anxiety     Cornea scar     Depression     HTN (hypertension)      Past Surgical History:   Procedure Laterality Date    TUBAL LIGATION       Family History   Problem Relation Age of Onset    Hypertension Father      Social History     Tobacco Use    Smoking status: Some Days     Types: Vaping with nicotine    Smokeless tobacco: Never   Substance Use Topics    Alcohol use: Yes     Alcohol/week: 2.0 standard drinks of alcohol     Types: 2 Glasses of wine per week    Drug use: Never     Review of Systems   Constitutional:  Negative for chills and fever.   HENT:  Negative for drooling, facial swelling and sore throat.    Respiratory:  Negative for cough, chest tightness and shortness of breath.    Cardiovascular:  Negative for chest pain and palpitations.   Gastrointestinal:  Negative for abdominal pain, diarrhea, nausea and vomiting.   Musculoskeletal:  Negative for back pain and myalgias.   Skin:  Positive for rash (body rash). Negative for wound.   Neurological:  Negative for dizziness, weakness and numbness.       Physical Exam     Initial Vitals [12/09/23 1029]   BP Pulse Resp Temp SpO2   (!) 152/89 107 20 99.5 °F (37.5 °C) 99 %      MAP       --         Physical Exam    Nursing note and vitals reviewed.  Constitutional: She appears well-developed and well-nourished.    HENT:   Nose: Nose normal.   Eyes: Conjunctivae are normal.   Neck: Neck supple.   Normal range of motion.  Cardiovascular:  Normal rate, regular rhythm, normal heart sounds and intact distal pulses.           Pulmonary/Chest: Breath sounds normal. No respiratory distress. She has no wheezes. She has no rhonchi. She has no rales. She exhibits no tenderness.   Abdominal: Abdomen is soft. Bowel sounds are normal. There is no abdominal tenderness. There is no rebound and no guarding.   Musculoskeletal:         General: Normal range of motion.      Cervical back: Normal range of motion and neck supple.     Neurological: She is alert.   Skin: Skin is warm. Capillary refill takes less than 2 seconds. Rash noted.   Raised inflamed rash with small bumps to left posterior shoulder and chest    A few raised, small blisters to left forearm             ED Course   Procedures  Labs Reviewed - No data to display       Imaging Results    None          Medications   dexAMETHasone injection 8 mg (8 mg Intramuscular Given 12/9/23 1123)     Medical Decision Making  48 year old female, presents to the emergency department with complaints rash that is forming blisters.    She states she was seen yesterday for rash was prescribed a cream.   Patient states she is applying cream but rash is spreading to her arm and chest.   She denies drainage, fever, chills, SOB.      Decadron IM given in the ED.   Prescribed Vistaril and oral steroid.  She is to continue using cream prescribed yesterday.    Rash consistent with contact dermatitis from unknown irritant.   Discussed rash care and follow up.      Risk  Prescription drug management.                                      Clinical Impression:  Final diagnoses:  [R21] Rash (Primary)          ED Disposition Condition    Discharge Stable          ED Prescriptions       Medication Sig Dispense Start Date End Date Auth. Provider    hydrOXYzine pamoate (VISTARIL) 25 MG Cap Take 1 capsule (25 mg  total) by mouth every 8 (eight) hours as needed (Itching). 15 capsule 12/9/2023 -- Melanie Hebert PA    predniSONE (DELTASONE) 10 MG tablet Take 1 tablet (10 mg total) by mouth once daily. for 4 days 4 tablet 12/9/2023 12/13/2023 Melanie Hebert PA          Follow-up Information       Follow up With Specialties Details Why Contact Info    [facility] Emergency Medicine  As needed, If symptoms worsen [address] [address] [phone]             Melanie Hebert PA  12/09/23 5460

## 2023-12-09 NOTE — DISCHARGE INSTRUCTIONS
Use unscented soaps and lotions to avoid irritation of rash.  Do not take hot baths or showers with rash because this will cause irritation and increase itching.  Use cream prescribed as directed.  Follow up with your primary care provider within 1 week.  If rash persists despite treatment, see a dermatologist for further evaluation.  Return to ER if you have trouble breathing or swelling that is concerning.      Do not take prednisone until tomorrow.

## 2024-03-21 PROBLEM — Z80.41 FAMILY HISTORY OF OVARIAN CANCER: Status: ACTIVE | Noted: 2024-03-21

## 2024-03-21 PROBLEM — D31.31 CHOROIDAL NEVUS OF RIGHT EYE: Status: RESOLVED | Noted: 2022-06-21 | Resolved: 2024-03-21

## 2024-03-21 PROBLEM — G47.00 INSOMNIA: Status: RESOLVED | Noted: 2019-12-06 | Resolved: 2024-03-21

## 2024-03-21 PROBLEM — Z79.890 HORMONE REPLACEMENT THERAPY (HRT): Status: ACTIVE | Noted: 2024-03-21
